# Patient Record
Sex: FEMALE | Race: OTHER | Employment: UNEMPLOYED | URBAN - METROPOLITAN AREA
[De-identification: names, ages, dates, MRNs, and addresses within clinical notes are randomized per-mention and may not be internally consistent; named-entity substitution may affect disease eponyms.]

---

## 2021-10-11 ENCOUNTER — HOSPITAL ENCOUNTER (EMERGENCY)
Facility: HOSPITAL | Age: 31
Discharge: HOME | End: 2021-10-11
Attending: EMERGENCY MEDICINE

## 2021-10-11 ENCOUNTER — APPOINTMENT (OUTPATIENT)
Dept: ULTRASOUND IMAGING | Facility: HOSPITAL | Age: 31
End: 2021-10-11
Attending: EMERGENCY MEDICINE

## 2021-10-11 VITALS
OXYGEN SATURATION: 100 % | TEMPERATURE: 97 F | HEIGHT: 66 IN | RESPIRATION RATE: 22 BRPM | BODY MASS INDEX: 21.86 KG/M2 | SYSTOLIC BLOOD PRESSURE: 109 MMHG | DIASTOLIC BLOOD PRESSURE: 67 MMHG | HEART RATE: 72 BPM | WEIGHT: 136 LBS

## 2021-10-11 DIAGNOSIS — O20.0 THREATENED MISCARRIAGE: Primary | ICD-10-CM

## 2021-10-11 PROCEDURE — 96360 HYDRATION IV INFUSION INIT: CPT

## 2021-10-11 PROCEDURE — 99284 EMERGENCY DEPT VISIT MOD MDM: CPT

## 2021-10-11 PROCEDURE — 86901 BLOOD TYPING SEROLOGIC RH(D): CPT | Performed by: EMERGENCY MEDICINE

## 2021-10-11 PROCEDURE — 86900 BLOOD TYPING SEROLOGIC ABO: CPT | Performed by: EMERGENCY MEDICINE

## 2021-10-11 PROCEDURE — 80053 COMPREHEN METABOLIC PANEL: CPT | Performed by: EMERGENCY MEDICINE

## 2021-10-11 PROCEDURE — 96361 HYDRATE IV INFUSION ADD-ON: CPT

## 2021-10-11 PROCEDURE — 76817 TRANSVAGINAL US OBSTETRIC: CPT | Performed by: EMERGENCY MEDICINE

## 2021-10-11 PROCEDURE — 84702 CHORIONIC GONADOTROPIN TEST: CPT | Performed by: EMERGENCY MEDICINE

## 2021-10-11 PROCEDURE — 76801 OB US < 14 WKS SINGLE FETUS: CPT | Performed by: EMERGENCY MEDICINE

## 2021-10-11 PROCEDURE — 85025 COMPLETE CBC W/AUTO DIFF WBC: CPT | Performed by: EMERGENCY MEDICINE

## 2021-10-11 NOTE — ED PROVIDER NOTES
Patient Seen in: [facility] Emergency Department      History   Patient presents with:  Pregnancy Issues    Stated Complaint: pt is pregnant and here for bleeding    Subjective:   HPI    This is a 15-year-old female who arrives here with pregnancy, there is no masses no rebound no guarding. Cardiovascular: Regular without murmurs    Extremities: Good pulses bilaterally. No calf tenderness    Neuro: Alert and oriented. The patient is moving all extremities there is no focal findings.     ED Course PREGNANCY LESS THAN 14 WEEKS (TRANSABDOMINAL/TRANSVAGINAL) (CPT=76801/42671)  COMPARISON:  None. INDICATIONS:  pt is pregnant and here for bleeding, juans kee  TECHNIQUE:  Transabdominal /Transvaginal pelvic ultrasound examination was performed.   BO and close follow-up with her OB.     Disposition and Plan     Clinical Impression:  Threatened miscarriage  (primary encounter diagnosis)     Disposition:  Discharge  10/11/2021  7:12 pm    Follow-up:  Jyothi Crespo DO  [address] [facility],[address]  [address]  [facility]

## 2021-10-11 NOTE — ED QUICK NOTES
Dr. Mayra Guevara at bedside and talking with patient using [employer]  ([employer] #[id number]). Pt informed of plans and testings verbalizing understanding .

## 2021-10-11 NOTE — ED INITIAL ASSESSMENT (HPI)
Pt LMP 21, states had positive pregnancy test, noted dark brown vaginal bleeding yesterday. , denies c/o abdominal pain/cramping.

## 2021-10-12 NOTE — ED QUICK NOTES
Pt reevaluated by er physician and informed of all her test reports and plan of care. Interpreted by her friend Riley Hoang, which pt requested to use as .  Pt and  verbalizing understanding